# Patient Record
Sex: MALE | Race: WHITE | NOT HISPANIC OR LATINO
[De-identification: names, ages, dates, MRNs, and addresses within clinical notes are randomized per-mention and may not be internally consistent; named-entity substitution may affect disease eponyms.]

---

## 2017-02-19 ENCOUNTER — TRANSCRIPTION ENCOUNTER (OUTPATIENT)
Age: 72
End: 2017-02-19

## 2017-06-22 ENCOUNTER — TRANSCRIPTION ENCOUNTER (OUTPATIENT)
Age: 72
End: 2017-06-22

## 2020-12-29 ENCOUNTER — APPOINTMENT (OUTPATIENT)
Dept: HEPATOLOGY | Facility: CLINIC | Age: 75
End: 2020-12-29
Payer: MEDICARE

## 2020-12-29 ENCOUNTER — LABORATORY RESULT (OUTPATIENT)
Age: 75
End: 2020-12-29

## 2020-12-29 VITALS
HEIGHT: 68 IN | SYSTOLIC BLOOD PRESSURE: 122 MMHG | WEIGHT: 148 LBS | DIASTOLIC BLOOD PRESSURE: 81 MMHG | TEMPERATURE: 98 F | BODY MASS INDEX: 22.43 KG/M2 | HEART RATE: 72 BPM

## 2020-12-29 DIAGNOSIS — Z78.9 OTHER SPECIFIED HEALTH STATUS: ICD-10-CM

## 2020-12-29 PROCEDURE — 99203 OFFICE O/P NEW LOW 30 MIN: CPT

## 2020-12-29 RX ORDER — SIMVASTATIN 10 MG/1
10 TABLET, FILM COATED ORAL
Qty: 90 | Refills: 0 | Status: DISCONTINUED | COMMUNITY
Start: 2020-09-22

## 2020-12-29 RX ORDER — SODIUM SULFATE, POTASSIUM SULFATE, MAGNESIUM SULFATE 17.5; 3.13; 1.6 G/ML; G/ML; G/ML
17.5-3.13-1.6 SOLUTION, CONCENTRATE ORAL
Qty: 354 | Refills: 0 | Status: DISCONTINUED | COMMUNITY
Start: 2020-08-27

## 2020-12-29 NOTE — ASSESSMENT
[FreeTextEntry1] : 76 y/o M w/ prior alcohol use but stopped drinking 1/2019  here for elevated liver tests first noticed January 2019.\par \par 1. 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

## 2020-12-29 NOTE — PHYSICAL EXAM
[Scleral Icterus] : No Scleral Icterus [Spider Angioma] : No spider angioma(s) were observed [Abdominal  Ascites] : no ascites [Ascites Fluid Wave] : no ascites fluid wave [Ascites Tense] : ascites is not tense [Non-Tender] : non-tender [Asterixis] : no asterixis observed [Jaundice] : No jaundice [Depression] : no depression [General Appearance - Alert] : alert [General Appearance - In No Acute Distress] : in no acute distress [Auscultation Breath Sounds / Voice Sounds] : lungs were clear to auscultation bilaterally [Heart Rate And Rhythm] : heart rate was normal and rhythm regular [Heart Sounds] : normal S1 and S2 [Heart Sounds Gallop] : no gallops [Murmurs] : no murmurs [Heart Sounds Pericardial Friction Rub] : no pericardial rub [Bowel Sounds] : normal bowel sounds [Abdomen Soft] : soft [Abdomen Tenderness] : non-tender [Abdomen Mass (___ Cm)] : no abdominal mass palpated [Skin Color & Pigmentation] : normal skin color and pigmentation [Skin Turgor] : normal skin turgor [] : no rash [Oriented To Time, Place, And Person] : oriented to person, place, and time [Impaired Insight] : insight and judgment were intact [Affect] : the affect was normal

## 2020-12-29 NOTE — HISTORY OF PRESENT ILLNESS
[de-identified] : 76 y/o M w/ prior alchol use but stopped drinking 1/2019  here for elevated liver tests first noticed January 2019. \par Stopped drinking 1/2019 (1 liter of vodka a week), now drinks twice a month.\par Father w/ lung cancer, no FH liver dz or alcohol problems.\par No liver biopsy or MRI/CT.\par Colonoscopy 9/9/20 - normal.\par No herbal medications, no medications, no green tea. \par 1/31/19 - , AST 37, ALT 59\par 3/12/19 - ALP 93, AST 30, ALT 52 \par US abd on 6/8/20 - showed fatty liver\par 9/10/20 - , AST 33, ALT 46\par CAP 9/15/20 Fibroscan , 4.1 kPa\par 9/28/20-10/28/20 - started simvastatin 5 mg three times weekly\par 10/27/20 - , AST 47, ALT 82\par 12/3/20 - , AST 41, ALT 77

## 2020-12-30 LAB
ALBUMIN SERPL ELPH-MCNC: 4.5 G/DL
ALP BLD-CCNC: 133 U/L
ALT SERPL-CCNC: 68 U/L
ANA PAT FLD IF-IMP: ABNORMAL
ANA SER IF-ACNC: ABNORMAL
ANION GAP SERPL CALC-SCNC: 11 MMOL/L
AST SERPL-CCNC: 35 U/L
BASOPHILS # BLD AUTO: 0.04 K/UL
BASOPHILS NFR BLD AUTO: 0.8 %
BILIRUB SERPL-MCNC: 0.7 MG/DL
BUN SERPL-MCNC: 23 MG/DL
CALCIUM SERPL-MCNC: 9.6 MG/DL
CHLORIDE SERPL-SCNC: 104 MMOL/L
CO2 SERPL-SCNC: 25 MMOL/L
CREAT SERPL-MCNC: 1.29 MG/DL
DEPRECATED KAPPA LC FREE/LAMBDA SER: 1.26 RATIO
EOSINOPHIL # BLD AUTO: 0.1 K/UL
EOSINOPHIL NFR BLD AUTO: 2.1 %
ESTIMATED AVERAGE GLUCOSE: 94 MG/DL
FERRITIN SERPL-MCNC: 281 NG/ML
GGT SERPL-CCNC: 16 U/L
GLUCOSE SERPL-MCNC: 90 MG/DL
HBA1C MFR BLD HPLC: 4.9 %
HBV CORE IGG+IGM SER QL: NONREACTIVE
HBV SURFACE AB SER QL: NONREACTIVE
HBV SURFACE AG SER QL: NONREACTIVE
HCT VFR BLD CALC: 48.2 %
HCV AB SER QL: NONREACTIVE
HCV S/CO RATIO: 0.22 S/CO
HEPATITIS A IGG ANTIBODY: NONREACTIVE
HGB BLD-MCNC: 15.9 G/DL
HIV1+2 AB SPEC QL IA.RAPID: NONREACTIVE
IGA SER QL IEP: 201 MG/DL
IGG SER QL IEP: 1209 MG/DL
IGM SER QL IEP: 88 MG/DL
IMM GRANULOCYTES NFR BLD AUTO: 0.4 %
INR PPP: 1.05 RATIO
IRON SATN MFR SERPL: 32 %
IRON SERPL-MCNC: 85 UG/DL
KAPPA LC CSF-MCNC: 1.5 MG/DL
KAPPA LC SERPL-MCNC: 1.89 MG/DL
LYMPHOCYTES # BLD AUTO: 1.23 K/UL
LYMPHOCYTES NFR BLD AUTO: 25.8 %
MAN DIFF?: NORMAL
MCHC RBC-ENTMCNC: 30.8 PG
MCHC RBC-ENTMCNC: 33 GM/DL
MCV RBC AUTO: 93.2 FL
MITOCHONDRIA AB SER IF-ACNC: NORMAL
MONOCYTES # BLD AUTO: 0.37 K/UL
MONOCYTES NFR BLD AUTO: 7.8 %
NEUTROPHILS # BLD AUTO: 3.01 K/UL
NEUTROPHILS NFR BLD AUTO: 63.1 %
PLATELET # BLD AUTO: 192 K/UL
POTASSIUM SERPL-SCNC: 4.4 MMOL/L
PROT SERPL-MCNC: 7.2 G/DL
PT BLD: 12.4 SEC
RBC # BLD: 5.17 M/UL
RBC # FLD: 12.3 %
SARS-COV-2 IGG SERPL IA-ACNC: 0.08 INDEX
SARS-COV-2 IGG SERPL QL IA: NEGATIVE
SMOOTH MUSCLE AB SER QL IF: ABNORMAL
SODIUM SERPL-SCNC: 141 MMOL/L
T PALLIDUM AB SER QL IA: NEGATIVE
TIBC SERPL-MCNC: 263 UG/DL
TSH SERPL-ACNC: 2.64 UIU/ML
TTG IGA SER IA-ACNC: 1.3 U/ML
TTG IGA SER-ACNC: NEGATIVE
UIBC SERPL-MCNC: 178 UG/DL
WBC # FLD AUTO: 4.77 K/UL

## 2020-12-31 LAB — MPO AB + PR3 PNL SER: NORMAL

## 2021-01-04 LAB
ACE BLD-CCNC: 49 U/L
IGG4 SER-MCNC: 43 MG/DL
LKM AB SER QL IF: <20.1 UNITS

## 2021-01-06 DIAGNOSIS — Z00.00 ENCOUNTER FOR GENERAL ADULT MEDICAL EXAMINATION W/OUT ABNORMAL FINDINGS: ICD-10-CM

## 2021-01-23 ENCOUNTER — TRANSCRIPTION ENCOUNTER (OUTPATIENT)
Age: 76
End: 2021-01-23

## 2021-01-29 ENCOUNTER — NON-APPOINTMENT (OUTPATIENT)
Age: 76
End: 2021-01-29

## 2021-02-09 ENCOUNTER — APPOINTMENT (OUTPATIENT)
Dept: HEPATOLOGY | Facility: CLINIC | Age: 76
End: 2021-02-09
Payer: MEDICARE

## 2021-02-09 ENCOUNTER — TRANSCRIPTION ENCOUNTER (OUTPATIENT)
Age: 76
End: 2021-02-09

## 2021-02-09 VITALS
RESPIRATION RATE: 15 BRPM | HEIGHT: 68 IN | BODY MASS INDEX: 22.28 KG/M2 | WEIGHT: 147 LBS | HEART RATE: 70 BPM | DIASTOLIC BLOOD PRESSURE: 83 MMHG | SYSTOLIC BLOOD PRESSURE: 135 MMHG | TEMPERATURE: 97.1 F

## 2021-02-09 PROCEDURE — 99213 OFFICE O/P EST LOW 20 MIN: CPT

## 2021-02-09 NOTE — HISTORY OF PRESENT ILLNESS
[de-identified] : 74 y/o M w/ prior alcohol use but stopped drinking 1/2019  here for elevated liver tests first noticed January 2019. \par Stopped drinking 1/2019 (1 liter of vodka a week), now drinks twice a month.\par Father w/ lung cancer, no FH liver dz or alcohol problems.\par No liver biopsy or MRI/CT.\par Colonoscopy 9/9/20 - normal.\par No herbal medications, no medications, no green tea. \par 1/31/19 - , AST 37, ALT 59\par 3/12/19 - ALP 93, AST 30, ALT 52 \par US abd on 6/8/20 - showed fatty liver\par 9/10/20 - , AST 33, ALT 46\par CAP 9/15/20 Fibroscan , 4.1 kPa\par 9/28/20-10/28/20 - started simvastatin 5 mg three times weekly\par 10/27/20 - , AST 47, ALT 82\par 12/3/20 - , AST 41, ALT 77\par 2/9/21 - doing well. no complaints. off simvtastatin. got covid vaccine recently.

## 2021-02-09 NOTE — PHYSICAL EXAM
[Non-Tender] : non-tender [General Appearance - In No Acute Distress] : in no acute distress [General Appearance - Alert] : alert [Auscultation Breath Sounds / Voice Sounds] : lungs were clear to auscultation bilaterally [Heart Rate And Rhythm] : heart rate was normal and rhythm regular [Heart Sounds] : normal S1 and S2 [Heart Sounds Gallop] : no gallops [Murmurs] : no murmurs [Heart Sounds Pericardial Friction Rub] : no pericardial rub [Bowel Sounds] : normal bowel sounds [Abdomen Soft] : soft [Abdomen Tenderness] : non-tender [Abdomen Mass (___ Cm)] : no abdominal mass palpated [Skin Color & Pigmentation] : normal skin color and pigmentation [Skin Turgor] : normal skin turgor [] : no rash [Oriented To Time, Place, And Person] : oriented to person, place, and time [Impaired Insight] : insight and judgment were intact [Affect] : the affect was normal [Scleral Icterus] : No Scleral Icterus [Spider Angioma] : No spider angioma(s) were observed [Abdominal  Ascites] : no ascites [Ascites Fluid Wave] : no ascites fluid wave [Ascites Tense] : ascites is not tense [Asterixis] : no asterixis observed [Jaundice] : No jaundice [Depression] : no depression

## 2021-02-09 NOTE — ASSESSMENT
[FreeTextEntry1] : 74 y/o M w/ prior alcohol use but stopped drinking 1/2019  here for elevated liver tests first noticed January 2019.\par \par 1. 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

## 2021-02-10 LAB
ALBUMIN SERPL ELPH-MCNC: 4.5 G/DL
ALP BLD-CCNC: 126 U/L
ALT SERPL-CCNC: 64 U/L
ANION GAP SERPL CALC-SCNC: 14 MMOL/L
AST SERPL-CCNC: 34 U/L
BASOPHILS # BLD AUTO: 0.04 K/UL
BASOPHILS NFR BLD AUTO: 0.7 %
BILIRUB SERPL-MCNC: 0.5 MG/DL
BUN SERPL-MCNC: 25 MG/DL
CALCIUM SERPL-MCNC: 9.5 MG/DL
CHLORIDE SERPL-SCNC: 103 MMOL/L
CO2 SERPL-SCNC: 23 MMOL/L
CREAT SERPL-MCNC: 1.35 MG/DL
EOSINOPHIL # BLD AUTO: 0.15 K/UL
EOSINOPHIL NFR BLD AUTO: 2.7 %
GLUCOSE SERPL-MCNC: 79 MG/DL
HCT VFR BLD CALC: 49.1 %
HGB BLD-MCNC: 16.1 G/DL
IMM GRANULOCYTES NFR BLD AUTO: 0.4 %
INR PPP: 1.07 RATIO
LYMPHOCYTES # BLD AUTO: 1.59 K/UL
LYMPHOCYTES NFR BLD AUTO: 28.6 %
MAN DIFF?: NORMAL
MCHC RBC-ENTMCNC: 31 PG
MCHC RBC-ENTMCNC: 32.8 GM/DL
MCV RBC AUTO: 94.4 FL
MONOCYTES # BLD AUTO: 0.41 K/UL
MONOCYTES NFR BLD AUTO: 7.4 %
NEUTROPHILS # BLD AUTO: 3.34 K/UL
NEUTROPHILS NFR BLD AUTO: 60.2 %
PLATELET # BLD AUTO: 192 K/UL
POTASSIUM SERPL-SCNC: 4.4 MMOL/L
PROT SERPL-MCNC: 6.9 G/DL
PT BLD: 12.6 SEC
RBC # BLD: 5.2 M/UL
RBC # FLD: 12.7 %
SODIUM SERPL-SCNC: 140 MMOL/L
WBC # FLD AUTO: 5.55 K/UL

## 2021-05-10 ENCOUNTER — APPOINTMENT (OUTPATIENT)
Dept: HEPATOLOGY | Facility: CLINIC | Age: 76
End: 2021-05-10
Payer: MEDICARE

## 2021-05-10 VITALS
SYSTOLIC BLOOD PRESSURE: 144 MMHG | HEART RATE: 81 BPM | TEMPERATURE: 97.2 F | RESPIRATION RATE: 15 BRPM | DIASTOLIC BLOOD PRESSURE: 91 MMHG | HEIGHT: 68 IN

## 2021-05-10 PROCEDURE — 90636 HEP A/HEP B VACC ADULT IM: CPT | Mod: GY

## 2021-05-10 PROCEDURE — 90471 IMMUNIZATION ADMIN: CPT

## 2021-05-10 PROCEDURE — 99213 OFFICE O/P EST LOW 20 MIN: CPT | Mod: 25

## 2021-05-10 PROCEDURE — 99213 OFFICE O/P EST LOW 20 MIN: CPT

## 2021-05-10 NOTE — HISTORY OF PRESENT ILLNESS
[de-identified] : 77 y/o M w/ prior alcohol use but stopped drinking 1/2019  here for elevated liver tests first noticed January 2019. \par \par Stopped drinking 1/2019 (1 liter of vodka a week), now drinks twice a month.\par Father w/ lung cancer, no FH liver dz or alcohol problems.\par No liver biopsy or MRI/CT.\par Colonoscopy 9/9/20 - normal.\par No herbal medications, no medications, no green tea. \par 1/31/19 - , AST 37, ALT 59\par 3/12/19 - ALP 93, AST 30, ALT 52 \par US abd on 6/8/20 - showed fatty liver\par 9/10/20 - , AST 33, ALT 46\par CAP 9/15/20 Fibroscan , 4.1 kPa\par 9/28/20-10/28/20 - started simvastatin 5 mg three times weekly\par 10/27/20 - , AST 47, ALT 82\par 12/3/20 - , AST 41, ALT 77\par 2/9/21 - doing well. no complaints. off simvastatin. got covid vaccine recently.\par 4/27/21 BW - , AST 38, ALT 64. \par 5/10/21 visit - no issues. got first HAV/HBV vaccine

## 2021-05-10 NOTE — ASSESSMENT
[FreeTextEntry1] : 77 y/o M w/ prior alcohol use but stopped drinking 1/2019  here for elevated liver tests first noticed January 2019.\par \par 1. mixed hepatocellular/cholestatic liver injury since 1/2019 without evidence of steatosis or scarring on Fibroscan as of 9/2020 \par - recheck labs today\par - suspect that he will need MRI or liver biopsy in the future if liver enzymes increases\par - due to high LDL, will restart simvastatin 5 mg weekly.\par \par 2. CKD\par - referred to nephrology, question - anything he can be on to prevent progression of kidney disease\par \par 3. RHM\par - HAV and HBV vaccination s/p first vaccination\par \par recheck labs in 1 month\par \par RTC 3 months

## 2021-05-10 NOTE — PHYSICAL EXAM
[Non-Tender] : non-tender [General Appearance - Alert] : alert [General Appearance - In No Acute Distress] : in no acute distress [Auscultation Breath Sounds / Voice Sounds] : lungs were clear to auscultation bilaterally [Heart Rate And Rhythm] : heart rate was normal and rhythm regular [Heart Sounds] : normal S1 and S2 [Heart Sounds Gallop] : no gallops [Murmurs] : no murmurs [Heart Sounds Pericardial Friction Rub] : no pericardial rub [Bowel Sounds] : normal bowel sounds [Abdomen Soft] : soft [Abdomen Tenderness] : non-tender [Abdomen Mass (___ Cm)] : no abdominal mass palpated [Skin Color & Pigmentation] : normal skin color and pigmentation [Skin Turgor] : normal skin turgor [] : no rash [Oriented To Time, Place, And Person] : oriented to person, place, and time [Impaired Insight] : insight and judgment were intact [Affect] : the affect was normal [Scleral Icterus] : No Scleral Icterus [Spider Angioma] : No spider angioma(s) were observed [Abdominal  Ascites] : no ascites [Ascites Fluid Wave] : no ascites fluid wave [Ascites Tense] : ascites is not tense [Asterixis] : no asterixis observed [Jaundice] : No jaundice [Depression] : no depression

## 2021-05-11 ENCOUNTER — NON-APPOINTMENT (OUTPATIENT)
Age: 76
End: 2021-05-11

## 2021-06-11 ENCOUNTER — APPOINTMENT (OUTPATIENT)
Dept: NEPHROLOGY | Facility: CLINIC | Age: 76
End: 2021-06-11
Payer: MEDICARE

## 2021-06-11 VITALS
SYSTOLIC BLOOD PRESSURE: 155 MMHG | DIASTOLIC BLOOD PRESSURE: 84 MMHG | WEIGHT: 147 LBS | BODY MASS INDEX: 22.35 KG/M2 | TEMPERATURE: 98 F | OXYGEN SATURATION: 98 % | HEART RATE: 84 BPM

## 2021-06-11 PROCEDURE — 99205 OFFICE O/P NEW HI 60 MIN: CPT

## 2021-06-11 NOTE — PHYSICAL EXAM
[General Appearance - Alert] : alert [General Appearance - In No Acute Distress] : in no acute distress [General Appearance - Well Nourished] : well nourished [General Appearance - Well-Appearing] : healthy appearing [Sclera] : the sclera and conjunctiva were normal [PERRL With Normal Accommodation] : pupils were equal in size, round, and reactive to light [Extraocular Movements] : extraocular movements were intact [Outer Ear] : the ears and nose were normal in appearance [Hearing Threshold Finger Rub Not [place]] : hearing was normal [Neck Appearance] : the appearance of the neck was normal [Neck Cervical Mass (___cm)] : no neck mass was observed [Jugular Venous Distention Increased] : there was no jugular-venous distention [Respiration, Rhythm And Depth] : normal respiratory rhythm and effort [Exaggerated Use Of Accessory Muscles For Inspiration] : no accessory muscle use [Auscultation Breath Sounds / Voice Sounds] : lungs were clear to auscultation bilaterally [Apical Impulse] : the apical impulse was normal [Heart Rate And Rhythm] : heart rate was normal and rhythm regular [Edema] : there was no peripheral edema [Bowel Sounds] : normal bowel sounds [Abdomen Soft] : soft [Abdomen Tenderness] : non-tender [Abdomen Mass (___ Cm)] : no abdominal mass palpated [No CVA Tenderness] : no ~M costovertebral angle tenderness [No Spinal Tenderness] : no spinal tenderness [Abnormal Walk] : normal gait [Skin Color & Pigmentation] : normal skin color and pigmentation [Nail Clubbing] : no clubbing  or cyanosis of the fingernails [Skin Turgor] : normal skin turgor [] : no rash [Skin Lesions] : no skin lesions [Cranial Nerves] : cranial nerves 2-12 were intact [No Focal Deficits] : no focal deficits [Oriented To Time, Place, And Person] : oriented to person, place, and time [Impaired Insight] : insight and judgment were intact [Affect] : the affect was normal [Mood] : the mood was normal [Memory Recent] : recent memory was not impaired

## 2021-06-14 LAB
APPEARANCE: CLEAR
BACTERIA: NEGATIVE
BILIRUBIN URINE: NEGATIVE
BLOOD URINE: NEGATIVE
COLOR: NORMAL
CREAT SPEC-SCNC: 128 MG/DL
CREAT SPEC-SCNC: 128 MG/DL
CREAT/PROT UR: 0 RATIO
GLUCOSE QUALITATIVE U: NEGATIVE
HYALINE CASTS: 0 /LPF
KETONES URINE: NEGATIVE
LEUKOCYTE ESTERASE URINE: NEGATIVE
MICROALBUMIN 24H UR DL<=1MG/L-MCNC: <1.2 MG/DL
MICROALBUMIN/CREAT 24H UR-RTO: NORMAL MG/G
MICROSCOPIC-UA: NORMAL
NITRITE URINE: NEGATIVE
PH URINE: 6
PROT UR-MCNC: 6 MG/DL
PROTEIN URINE: NEGATIVE
RED BLOOD CELLS URINE: 2 /HPF
SPECIFIC GRAVITY URINE: 1.02
SQUAMOUS EPITHELIAL CELLS: 0 /HPF
UROBILINOGEN URINE: NORMAL
WHITE BLOOD CELLS URINE: 0 /HPF

## 2021-06-14 NOTE — CONSULT LETTER
[Dear  ___] : Dear  [unfilled], [Consult Letter:] : I had the pleasure of evaluating your patient, [unfilled]. [Please see my note below.] : Please see my note below. [Consult Closing:] : Thank you very much for allowing me to participate in the care of this patient.  If you have any questions, please do not hesitate to contact me. [Sincerely,] : Sincerely, [FreeTextEntry3] : Gurvinder Greene\par email: [email or web address]

## 2021-06-14 NOTE — ASSESSMENT
[FreeTextEntry1] : Mr. KYLIE STEVE is a 77yo M being evaluated for CKD\par \par -----------------------------------------\par # CKD STAGE 2-3\par - Baseline Cr. 1.2 to 1.35 (2020 to 2021)\par - SCr trend: stable at 1.35 past few tests\par - U/a: PENDING\par - UPC: PENDING\par - Sono: PENDING\par --- RISK FACTORS:\par 1) Age\par 2) Vol depletion due to poor intake + regular exercise\par 3) Obstructive uropathy- pt w/ mild prostate enlargement\par --- PLAN:\par The patient has borderline stage 3 CKD (eGFR 58 via MDRD) with stable SCr this year during repeated tests. No other metabolic abnormalities\par 1) No need for repeat renal panel. Last labs 6/1\par 2) U/A, UPC\par 3) Sono to r/o obstruction\par 4) Urology follow-up\par \par I will call the patient with lab results early next week. Based on stable renal function, can see me yearly or for any changes

## 2021-06-14 NOTE — ADDENDUM
[FreeTextEntry1] : - Spoke to Sandro about u/a, UPC. Sono still PENDING.\par - No further workup or intervention from my standpoint\par - I explaiend that techinically he has "CKD, stage 2 to early stage 3 which is likely age-related. It is techinically an abnormal lab result but for your age, I find it completely within an acceptable range"\par - Follow-up 1 year or PRN\par - Will await sono findings

## 2021-06-14 NOTE — HISTORY OF PRESENT ILLNESS
[FreeTextEntry1] : Mr. KYLIE STEVE is a 77yo M with mixed hepatocellular. cholestatic liver injury who came in for an initial visit on 6/11/2021 for CKD\par \par ---------------------------------\par 6/11/2021\par - Patient admittedly anxious as he read his labs. He looked up "GFR" and "CKD" and found that he has "advanced CKD"\par - Denied NSAID use\par - Denied dysuria, hematuria, flank pain, frothy urine, flank pain, obstructive symptoms

## 2021-06-15 ENCOUNTER — APPOINTMENT (OUTPATIENT)
Dept: HEPATOLOGY | Facility: CLINIC | Age: 76
End: 2021-06-15
Payer: MEDICARE

## 2021-06-15 DIAGNOSIS — Z23 ENCOUNTER FOR IMMUNIZATION: ICD-10-CM

## 2021-06-15 PROCEDURE — 90636 HEP A/HEP B VACC ADULT IM: CPT | Mod: GY

## 2021-06-15 PROCEDURE — G0010: CPT

## 2021-08-17 ENCOUNTER — APPOINTMENT (OUTPATIENT)
Dept: HEPATOLOGY | Facility: CLINIC | Age: 76
End: 2021-08-17
Payer: MEDICARE

## 2021-08-17 ENCOUNTER — NON-APPOINTMENT (OUTPATIENT)
Age: 76
End: 2021-08-17

## 2021-08-17 VITALS
BODY MASS INDEX: 22.58 KG/M2 | HEART RATE: 73 BPM | TEMPERATURE: 98 F | RESPIRATION RATE: 15 BRPM | DIASTOLIC BLOOD PRESSURE: 81 MMHG | OXYGEN SATURATION: 98 % | WEIGHT: 149 LBS | HEIGHT: 68 IN | SYSTOLIC BLOOD PRESSURE: 133 MMHG

## 2021-08-17 LAB
ALBUMIN SERPL ELPH-MCNC: 4.2 G/DL
ALP BLD-CCNC: 121 U/L
ALT SERPL-CCNC: 48 U/L
ANION GAP SERPL CALC-SCNC: 10 MMOL/L
AST SERPL-CCNC: 30 U/L
BASOPHILS # BLD AUTO: 0.03 K/UL
BASOPHILS NFR BLD AUTO: 0.6 %
BILIRUB SERPL-MCNC: 0.8 MG/DL
BUN SERPL-MCNC: 17 MG/DL
CALCIUM SERPL-MCNC: 9.4 MG/DL
CHLORIDE SERPL-SCNC: 107 MMOL/L
CK SERPL-CCNC: 139 U/L
CO2 SERPL-SCNC: 25 MMOL/L
CREAT SERPL-MCNC: 1.3 MG/DL
EOSINOPHIL # BLD AUTO: 0.15 K/UL
EOSINOPHIL NFR BLD AUTO: 2.8 %
GGT SERPL-CCNC: 14 U/L
GLUCOSE SERPL-MCNC: 72 MG/DL
HCT VFR BLD CALC: 46.1 %
HGB BLD-MCNC: 16 G/DL
IMM GRANULOCYTES NFR BLD AUTO: 0.7 %
INR PPP: 1.03 RATIO
LYMPHOCYTES # BLD AUTO: 1.21 K/UL
LYMPHOCYTES NFR BLD AUTO: 22.2 %
MAN DIFF?: NORMAL
MCHC RBC-ENTMCNC: 31.7 PG
MCHC RBC-ENTMCNC: 34.7 GM/DL
MCV RBC AUTO: 91.3 FL
MONOCYTES # BLD AUTO: 0.48 K/UL
MONOCYTES NFR BLD AUTO: 8.8 %
NEUTROPHILS # BLD AUTO: 3.53 K/UL
NEUTROPHILS NFR BLD AUTO: 64.9 %
PLATELET # BLD AUTO: 183 K/UL
POTASSIUM SERPL-SCNC: 4.4 MMOL/L
PROT SERPL-MCNC: 6.6 G/DL
PT BLD: 12.1 SEC
RBC # BLD: 5.05 M/UL
RBC # FLD: 12.8 %
SODIUM SERPL-SCNC: 142 MMOL/L
WBC # FLD AUTO: 5.44 K/UL

## 2021-08-17 PROCEDURE — 99214 OFFICE O/P EST MOD 30 MIN: CPT

## 2021-08-17 NOTE — PHYSICAL EXAM
[Non-Tender] : non-tender [General Appearance - In No Acute Distress] : in no acute distress [General Appearance - Alert] : alert [Auscultation Breath Sounds / Voice Sounds] : lungs were clear to auscultation bilaterally [Heart Rate And Rhythm] : heart rate was normal and rhythm regular [Heart Sounds] : normal S1 and S2 [Heart Sounds Gallop] : no gallops [Murmurs] : no murmurs [Heart Sounds Pericardial Friction Rub] : no pericardial rub [Abdomen Soft] : soft [Bowel Sounds] : normal bowel sounds [Abdomen Tenderness] : non-tender [Abdomen Mass (___ Cm)] : no abdominal mass palpated [Skin Color & Pigmentation] : normal skin color and pigmentation [Skin Turgor] : normal skin turgor [] : no rash [Impaired Insight] : insight and judgment were intact [Oriented To Time, Place, And Person] : oriented to person, place, and time [Affect] : the affect was normal [Scleral Icterus] : No Scleral Icterus [Spider Angioma] : No spider angioma(s) were observed [Abdominal  Ascites] : no ascites [Ascites Fluid Wave] : no ascites fluid wave [Ascites Tense] : ascites is not tense [Asterixis] : no asterixis observed [Jaundice] : No jaundice [Depression] : no depression

## 2021-08-17 NOTE — ASSESSMENT
[FreeTextEntry1] : 77 y/o M w/ prior alcohol use but stopped drinking 1/2019  here for elevated liver tests first noticed January 2019.\par \par 1. Mixed hepatocellular/cholestatic liver injury since 1/2019 without evidence of steatosis or scarring on Fibroscan as of 9/2020 \par - recheck labs today\par - suspect that he will need MRI or liver biopsy in the future if liver enzymes increases however if it remains stable and Fibroscan does not show evidence of scarring then will hold off\par - due to high LDL, currently on simvastatin 5 mg weekly (restarted on 5/10-)\par \par 2. CKD\par - following nephrology, next appt in a year. \par \par 3. RHM\par - HAV and HBV vaccination s/p first vaccination\par \par \par \par Recheck labs now and in 3 months w/ lipid panel\par \par RTC 3 months

## 2021-08-17 NOTE — HISTORY OF PRESENT ILLNESS
[de-identified] : 75 y/o M w/ prior alcohol use but stopped drinking 1/2019  here for elevated liver tests first noticed January 2019. \par \par Stopped drinking 1/2019 (1 liter of vodka a week), now drinks twice a month.\par Father w/ lung cancer, no FH liver dz or alcohol problems.\par No liver biopsy or MRI/CT.\par Colonoscopy 9/9/20 - normal.\par No herbal medications, no medications, no green tea. \par 1/31/19 - , AST 37, ALT 59\par 3/12/19 - ALP 93, AST 30, ALT 52 \par US abd on 6/8/20 - showed fatty liver\par 9/10/20 - , AST 33, ALT 46\par CAP 9/15/20 Fibroscan , 4.1 kPa\par 9/28/20-10/28/20 - started simvastatin 5 mg three times weekly\par 10/27/20 - , AST 47, ALT 82\par 12/3/20 - , AST 41, ALT 77\par 2/9/21 - doing well. no complaints. off simvastatin. got covid vaccine recently.\par 4/27/21 BW - , AST 38, ALT 64. \par 5/10/21 visit - no issues. got first HAV/HBV vaccine\par 6/1/21 BW - , AST 40, ALT 66\par 8/17/21 visit - recently was in the ED at the end of June 2021 for nausea related to tooth extraction. He also saw a nephrologist Dr. Greene. He is currently taking simvastatin 5 mg weekly. weight stable.

## 2021-09-28 ENCOUNTER — NON-APPOINTMENT (OUTPATIENT)
Age: 76
End: 2021-09-28

## 2021-10-18 ENCOUNTER — TRANSCRIPTION ENCOUNTER (OUTPATIENT)
Age: 76
End: 2021-10-18

## 2021-11-16 ENCOUNTER — APPOINTMENT (OUTPATIENT)
Dept: HEPATOLOGY | Facility: CLINIC | Age: 76
End: 2021-11-16
Payer: MEDICARE

## 2021-11-16 VITALS
RESPIRATION RATE: 16 BRPM | HEIGHT: 68 IN | BODY MASS INDEX: 22.58 KG/M2 | HEART RATE: 85 BPM | DIASTOLIC BLOOD PRESSURE: 88 MMHG | WEIGHT: 149 LBS | OXYGEN SATURATION: 100 % | SYSTOLIC BLOOD PRESSURE: 137 MMHG | TEMPERATURE: 97.3 F

## 2021-11-16 VITALS
TEMPERATURE: 97.3 F | SYSTOLIC BLOOD PRESSURE: 122 MMHG | OXYGEN SATURATION: 76 % | DIASTOLIC BLOOD PRESSURE: 79 MMHG | HEIGHT: 60 IN | RESPIRATION RATE: 16 BRPM | BODY MASS INDEX: 29.1 KG/M2

## 2021-11-16 PROCEDURE — G0010: CPT

## 2021-11-16 PROCEDURE — 99214 OFFICE O/P EST MOD 30 MIN: CPT | Mod: 25

## 2021-11-16 PROCEDURE — 90636 HEP A/HEP B VACC ADULT IM: CPT | Mod: GY

## 2022-03-01 ENCOUNTER — APPOINTMENT (OUTPATIENT)
Dept: HEPATOLOGY | Facility: CLINIC | Age: 77
End: 2022-03-01
Payer: MEDICARE

## 2022-03-01 VITALS
TEMPERATURE: 98 F | RESPIRATION RATE: 16 BRPM | DIASTOLIC BLOOD PRESSURE: 82 MMHG | BODY MASS INDEX: 29.06 KG/M2 | HEART RATE: 78 BPM | WEIGHT: 148 LBS | HEIGHT: 60 IN | SYSTOLIC BLOOD PRESSURE: 125 MMHG | OXYGEN SATURATION: 99 %

## 2022-03-01 PROCEDURE — 99214 OFFICE O/P EST MOD 30 MIN: CPT

## 2022-03-01 NOTE — HISTORY OF PRESENT ILLNESS
[de-identified] : 75 y/o M w/ prior alcohol use but stopped drinking 1/2019  here for elevated liver tests first noticed January 2019. \par \par Stopped drinking 1/2019 (1 liter of vodka a week), now drinks twice a month.\par Father w/ lung cancer, no FH liver dz or alcohol problems.\par No liver biopsy or MRI/CT.\par Colonoscopy 9/9/20 - normal.\par No herbal medications, no medications, no green tea. \par 1/31/19 - , AST 37, ALT 59\par 3/12/19 - ALP 93, AST 30, ALT 52 \par US abd on 6/8/20 - showed fatty liver\par 9/10/20 - , AST 33, ALT 46\par CAP 9/15/20 Fibroscan , 4.1 kPa\par 9/28/20-10/28/20 - started simvastatin 5 mg three times weekly\par 10/27/20 - , AST 47, ALT 82\par 12/3/20 - , AST 41, ALT 77\par 2/9/21 - doing well. no complaints. off simvastatin. got covid vaccine recently.\par 4/27/21 BW - , AST 38, ALT 64. \par 5/10/21 visit - no issues. got first HAV/HBV vaccine\par 6/1/21 BW - , AST 40, ALT 66\par 8/17/21 visit - recently was in the ED at the end of June 2021 for nausea related to tooth extraction. He also saw a nephrologist Dr. Greene. He is currently taking simvastatin 5 mg weekly. weight stable.\par 9/28/21 BW - AST 36, ALT 37\par 11/5/21 BW - AST 42, ALT 72, , . he is on simvastatin 5 mg weekly. \par 3/1/22 visit - on pravastatin 10 mg weekly. 2/18/22 BW - AST 33 ALT 54\par

## 2022-03-01 NOTE — ASSESSMENT
[FreeTextEntry1] : 75 y/o M w/ prior alcohol use but stopped drinking 1/2019  here for elevated liver tests first noticed January 2019.\par \par # Mixed hepatocellular/cholestatic liver injury since 1/2019 without evidence of steatosis or scarring on Fibroscan as of 9/2020 \par - suspect that he will need MRI or liver biopsy in the future if liver enzymes increases however if it remains stable and Fibroscan does not show evidence of scarring then will hold off\par - due to high LDL, currently on simvastatin 5 mg weekly (restarted on 5/10-)\par Labs now show mildy increasing liver enzymes compared to 9/2021 when it was relatively normal.\par Will switch him from simvastatin to pravastatin 10 mg weekly (take prior to bedtime) as pravastatin is less liver toxic. Unusually we can keep statins on unless lab tests are >3x ULN (120 U/L). \par \par # CKD\par - following nephrology, next appt in a year. \par \par # RHM\par - HAV and HBV vaccination s/p heplisav\par \par \par \par Recheck labs in 6 months w/ lipid panel\par

## 2022-03-01 NOTE — ASSESSMENT
[FreeTextEntry1] : 77 y/o M w/ prior alcohol use but stopped drinking 1/2019  here for elevated liver tests first noticed January 2019.\par \par # Mixed hepatocellular/cholestatic liver injury since 1/2019 without evidence of steatosis or scarring on Fibroscan as of 9/2020 \par - suspect that he will need MRI or liver biopsy in the future if liver enzymes increases however if it remains stable and Fibroscan does not show evidence of scarring then will hold off\par - due to high LDL, he was put on simvastatin 5 mg weekly (restarted on 5/10-)\par Labs then showed mildly increasing liver enzymes compared to 9/2021 when it was relatively normal.\par He was then switched from simvastatin to pravastatin 10 mg weekly (take prior to bedtime) as pravastatin is less liver toxic. Unusually we can keep statins on unless lab tests are >3x ULN (120 U/L). \par his liver enzymes have now improved bu we will keep following.\par \par # CKD\par - following nephrology, next appt in a year. \par \par # RHM\par - HAV and HBV vaccination s/p heplisav\par \par \par \par Recheck labs in 6 months w/ lipid panel\par

## 2022-03-01 NOTE — HISTORY OF PRESENT ILLNESS
[de-identified] : 75 y/o M w/ prior alcohol use but stopped drinking 1/2019  here for elevated liver tests first noticed January 2019. \par \par Stopped drinking 1/2019 (1 liter of vodka a week), now drinks twice a month.\par Father w/ lung cancer, no FH liver dz or alcohol problems.\par No liver biopsy or MRI/CT.\par Colonoscopy 9/9/20 - normal.\par No herbal medications, no medications, no green tea. \par 1/31/19 - , AST 37, ALT 59\par 3/12/19 - ALP 93, AST 30, ALT 52 \par US abd on 6/8/20 - showed fatty liver\par 9/10/20 - , AST 33, ALT 46\par CAP 9/15/20 Fibroscan , 4.1 kPa\par 9/28/20-10/28/20 - started simvastatin 5 mg three times weekly\par 10/27/20 - , AST 47, ALT 82\par 12/3/20 - , AST 41, ALT 77\par 2/9/21 - doing well. no complaints. off simvastatin. got covid vaccine recently.\par 4/27/21 BW - , AST 38, ALT 64. \par 5/10/21 visit - no issues. got first HAV/HBV vaccine\par 6/1/21 BW - , AST 40, ALT 66\par 8/17/21 visit - recently was in the ED at the end of June 2021 for nausea related to tooth extraction. He also saw a nephrologist Dr. Greene. He is currently taking simvastatin 5 mg weekly. weight stable.\par 9/28/21 BW - AST 36, ALT 37\par 11/5/21 BW - AST 42, ALT 72, , . he is on simvastatin 5 mg weekly. \par 3/1/22 visit - on pravastatin 10 mg weekly. 2/18/22 BW - AST 33 ALT 54\par

## 2022-08-17 ENCOUNTER — APPOINTMENT (OUTPATIENT)
Dept: HEPATOLOGY | Facility: CLINIC | Age: 77
End: 2022-08-17

## 2022-09-09 ENCOUNTER — APPOINTMENT (OUTPATIENT)
Dept: HEPATOLOGY | Facility: CLINIC | Age: 77
End: 2022-09-09

## 2022-09-23 ENCOUNTER — APPOINTMENT (OUTPATIENT)
Dept: HEPATOLOGY | Facility: CLINIC | Age: 77
End: 2022-09-23

## 2022-09-23 VITALS
WEIGHT: 145 LBS | HEIGHT: 68 IN | BODY MASS INDEX: 21.98 KG/M2 | DIASTOLIC BLOOD PRESSURE: 76 MMHG | SYSTOLIC BLOOD PRESSURE: 127 MMHG | TEMPERATURE: 97.6 F | HEART RATE: 79 BPM | RESPIRATION RATE: 16 BRPM | OXYGEN SATURATION: 99 %

## 2022-09-23 PROCEDURE — 99214 OFFICE O/P EST MOD 30 MIN: CPT

## 2022-09-23 NOTE — HISTORY OF PRESENT ILLNESS
[de-identified] : 78 y/o M w/ prior alcohol use but stopped drinking 1/2019  here for elevated liver tests first noticed January 2019. \par \par Stopped drinking 1/2019 (1 liter of vodka a week), now drinks twice a month.\par Father w/ lung cancer, no FH liver dz or alcohol problems.\par No liver biopsy or MRI/CT.\par Colonoscopy 9/9/20 - normal.\par No herbal medications, no medications, no green tea. \par 1/31/19 - , AST 37, ALT 59\par 3/12/19 - ALP 93, AST 30, ALT 52 \par US abd on 6/8/20 - showed fatty liver\par 9/10/20 - , AST 33, ALT 46\par CAP 9/15/20 Fibroscan , 4.1 kPa\par 9/28/20-10/28/20 - started simvastatin 5 mg three times weekly\par 10/27/20 - , AST 47, ALT 82\par 12/3/20 - , AST 41, ALT 77\par 2/9/21 - doing well. no complaints. off simvastatin. got covid vaccine recently.\par 4/27/21 BW - , AST 38, ALT 64. \par 5/10/21 visit - no issues. got first HAV/HBV vaccine\par 6/1/21 BW - , AST 40, ALT 66\par 8/17/21 visit - recently was in the ED at the end of June 2021 for nausea related to tooth extraction. He also saw a nephrologist Dr. Greene. He is currently taking simvastatin 5 mg weekly. weight stable.\par 9/28/21 BW - AST 36, ALT 37\par 11/5/21 BW - AST 42, ALT 72, , . he is on simvastatin 5 mg weekly. \par 3/1/22 visit - on pravastatin 10 mg weekly. 2/18/22 BW - AST 33 ALT 54\par 9/23/22 visit - 9/9/22 B - AST 32, ALT 47. on pravastatin 10 mg once weekly.

## 2022-09-23 NOTE — ASSESSMENT
[FreeTextEntry1] : 78 y/o M w/ prior alcohol use but stopped drinking 1/2019  here for elevated liver tests first noticed January 2019.\par \par # Mixed hepatocellular/cholestatic liver injury since 1/2019 without evidence of steatosis or scarring on Fibroscan as of 9/2020. Suspect due to DILI from statin, mild, developing tolerance which is a known phenomenon with statins\par Due to high LDL, he was put on simvastatin 5 mg weekly (restarted on 5/10-)\par Labs then showed mildly increasing liver enzymes compared to 9/2021 when it was relatively normal.\par He was then switched from simvastatin to pravastatin 10 mg weekly (take prior to bedtime) as pravastatin is less liver toxic. Unusually we can keep statins on unless lab tests are >3x ULN (120 U/L). \par his liver enzymes have now improved and stable.\par recommend lab checks every 6-12 months by his PCP\par refer back if ALT >2x ULN (80 IU/mL)\par \par Return to clinic as needed

## 2022-10-26 ENCOUNTER — NON-APPOINTMENT (OUTPATIENT)
Age: 77
End: 2022-10-26

## 2022-12-27 ENCOUNTER — RX RENEWAL (OUTPATIENT)
Age: 77
End: 2022-12-27

## 2022-12-27 RX ORDER — PRAVASTATIN SODIUM 10 MG/1
10 TABLET ORAL
Qty: 30 | Refills: 11 | Status: ACTIVE | COMMUNITY
Start: 2021-11-16 | End: 1900-01-01

## 2023-01-12 ENCOUNTER — APPOINTMENT (OUTPATIENT)
Dept: HEPATOLOGY | Facility: CLINIC | Age: 78
End: 2023-01-12
Payer: MEDICARE

## 2023-01-12 VITALS
RESPIRATION RATE: 16 BRPM | HEART RATE: 71 BPM | SYSTOLIC BLOOD PRESSURE: 106 MMHG | OXYGEN SATURATION: 99 % | BODY MASS INDEX: 22.28 KG/M2 | TEMPERATURE: 98 F | DIASTOLIC BLOOD PRESSURE: 72 MMHG | WEIGHT: 147 LBS | HEIGHT: 68 IN

## 2023-01-12 PROCEDURE — 99214 OFFICE O/P EST MOD 30 MIN: CPT

## 2023-02-07 ENCOUNTER — NON-APPOINTMENT (OUTPATIENT)
Age: 78
End: 2023-02-07

## 2023-07-12 ENCOUNTER — APPOINTMENT (OUTPATIENT)
Dept: HEPATOLOGY | Facility: CLINIC | Age: 78
End: 2023-07-12
Payer: MEDICARE

## 2023-07-12 VITALS
RESPIRATION RATE: 14 BRPM | HEART RATE: 65 BPM | OXYGEN SATURATION: 99 % | DIASTOLIC BLOOD PRESSURE: 90 MMHG | BODY MASS INDEX: 21.98 KG/M2 | WEIGHT: 145 LBS | TEMPERATURE: 97.6 F | HEIGHT: 68 IN | SYSTOLIC BLOOD PRESSURE: 146 MMHG

## 2023-07-12 PROCEDURE — 99215 OFFICE O/P EST HI 40 MIN: CPT

## 2023-07-12 PROCEDURE — 76981 USE PARENCHYMA: CPT

## 2023-07-23 ENCOUNTER — TRANSCRIPTION ENCOUNTER (OUTPATIENT)
Age: 78
End: 2023-07-23

## 2023-10-31 ENCOUNTER — NON-APPOINTMENT (OUTPATIENT)
Age: 78
End: 2023-10-31

## 2024-01-17 ENCOUNTER — APPOINTMENT (OUTPATIENT)
Dept: HEPATOLOGY | Facility: CLINIC | Age: 79
End: 2024-01-17
Payer: MEDICARE

## 2024-01-17 VITALS
HEIGHT: 68 IN | WEIGHT: 144 LBS | RESPIRATION RATE: 14 BRPM | BODY MASS INDEX: 21.82 KG/M2 | DIASTOLIC BLOOD PRESSURE: 84 MMHG | HEART RATE: 73 BPM | SYSTOLIC BLOOD PRESSURE: 127 MMHG | TEMPERATURE: 97.9 F | OXYGEN SATURATION: 98 %

## 2024-01-17 DIAGNOSIS — R74.8 ABNORMAL LEVELS OF OTHER SERUM ENZYMES: ICD-10-CM

## 2024-01-17 DIAGNOSIS — K76.0 FATTY (CHANGE OF) LIVER, NOT ELSEWHERE CLASSIFIED: ICD-10-CM

## 2024-01-17 DIAGNOSIS — N18.30 CHRONIC KIDNEY DISEASE, STAGE 3 UNSPECIFIED: ICD-10-CM

## 2024-01-17 DIAGNOSIS — E78.5 HYPERLIPIDEMIA, UNSPECIFIED: ICD-10-CM

## 2024-01-17 PROCEDURE — 99214 OFFICE O/P EST MOD 30 MIN: CPT

## 2024-07-03 ENCOUNTER — APPOINTMENT (OUTPATIENT)
Dept: ULTRASOUND IMAGING | Facility: CLINIC | Age: 79
End: 2024-07-03
Payer: MEDICARE

## 2024-07-03 ENCOUNTER — OUTPATIENT (OUTPATIENT)
Dept: OUTPATIENT SERVICES | Facility: HOSPITAL | Age: 79
LOS: 1 days | End: 2024-07-03
Payer: MEDICARE

## 2024-07-03 DIAGNOSIS — Z00.8 ENCOUNTER FOR OTHER GENERAL EXAMINATION: ICD-10-CM

## 2024-07-03 PROCEDURE — 76700 US EXAM ABDOM COMPLETE: CPT

## 2024-07-03 PROCEDURE — 76700 US EXAM ABDOM COMPLETE: CPT | Mod: 26

## 2024-07-05 ENCOUNTER — NON-APPOINTMENT (OUTPATIENT)
Age: 79
End: 2024-07-05

## 2024-07-10 ENCOUNTER — APPOINTMENT (OUTPATIENT)
Dept: HEPATOLOGY | Facility: CLINIC | Age: 79
End: 2024-07-10
Payer: MEDICARE

## 2024-07-10 VITALS
SYSTOLIC BLOOD PRESSURE: 115 MMHG | HEIGHT: 68 IN | TEMPERATURE: 98.3 F | RESPIRATION RATE: 14 BRPM | HEART RATE: 72 BPM | BODY MASS INDEX: 22.43 KG/M2 | DIASTOLIC BLOOD PRESSURE: 75 MMHG | OXYGEN SATURATION: 97 % | WEIGHT: 148 LBS

## 2024-07-10 DIAGNOSIS — R74.8 ABNORMAL LEVELS OF OTHER SERUM ENZYMES: ICD-10-CM

## 2024-07-10 DIAGNOSIS — E78.5 HYPERLIPIDEMIA, UNSPECIFIED: ICD-10-CM

## 2024-07-10 DIAGNOSIS — N18.30 CHRONIC KIDNEY DISEASE, STAGE 3 UNSPECIFIED: ICD-10-CM

## 2024-07-10 DIAGNOSIS — Z87.19 PERSONAL HISTORY OF OTHER DISEASES OF THE DIGESTIVE SYSTEM: ICD-10-CM

## 2024-07-10 PROCEDURE — 99214 OFFICE O/P EST MOD 30 MIN: CPT

## 2024-07-10 PROCEDURE — 99204 OFFICE O/P NEW MOD 45 MIN: CPT

## 2024-08-09 ENCOUNTER — TRANSCRIPTION ENCOUNTER (OUTPATIENT)
Age: 79
End: 2024-08-09

## 2024-09-28 ENCOUNTER — NON-APPOINTMENT (OUTPATIENT)
Age: 79
End: 2024-09-28

## 2025-01-16 DIAGNOSIS — R74.8 ABNORMAL LEVELS OF OTHER SERUM ENZYMES: ICD-10-CM

## 2025-01-17 ENCOUNTER — NON-APPOINTMENT (OUTPATIENT)
Age: 80
End: 2025-01-17

## 2025-02-03 ENCOUNTER — TRANSCRIPTION ENCOUNTER (OUTPATIENT)
Age: 80
End: 2025-02-03

## 2025-02-05 ENCOUNTER — NON-APPOINTMENT (OUTPATIENT)
Age: 80
End: 2025-02-05

## 2025-02-05 ENCOUNTER — TRANSCRIPTION ENCOUNTER (OUTPATIENT)
Age: 80
End: 2025-02-05

## 2025-05-19 ENCOUNTER — NON-APPOINTMENT (OUTPATIENT)
Age: 80
End: 2025-05-19

## 2025-06-28 ENCOUNTER — NON-APPOINTMENT (OUTPATIENT)
Age: 80
End: 2025-06-28

## 2025-07-30 ENCOUNTER — APPOINTMENT (OUTPATIENT)
Dept: HEPATOLOGY | Facility: CLINIC | Age: 80
End: 2025-07-30
Payer: MEDICARE

## 2025-07-30 VITALS
HEIGHT: 68 IN | OXYGEN SATURATION: 100 % | WEIGHT: 145 LBS | TEMPERATURE: 98 F | SYSTOLIC BLOOD PRESSURE: 106 MMHG | HEART RATE: 80 BPM | DIASTOLIC BLOOD PRESSURE: 71 MMHG | BODY MASS INDEX: 21.98 KG/M2

## 2025-07-30 DIAGNOSIS — K76.0 FATTY (CHANGE OF) LIVER, NOT ELSEWHERE CLASSIFIED: ICD-10-CM

## 2025-07-30 DIAGNOSIS — R74.8 ABNORMAL LEVELS OF OTHER SERUM ENZYMES: ICD-10-CM

## 2025-07-30 PROCEDURE — 99214 OFFICE O/P EST MOD 30 MIN: CPT

## 2025-08-05 ENCOUNTER — OUTPATIENT (OUTPATIENT)
Dept: OUTPATIENT SERVICES | Facility: HOSPITAL | Age: 80
LOS: 1 days | End: 2025-08-05
Payer: MEDICARE

## 2025-08-05 ENCOUNTER — APPOINTMENT (OUTPATIENT)
Dept: ULTRASOUND IMAGING | Facility: CLINIC | Age: 80
End: 2025-08-05
Payer: MEDICARE

## 2025-08-05 DIAGNOSIS — Z00.00 ENCOUNTER FOR GENERAL ADULT MEDICAL EXAMINATION WITHOUT ABNORMAL FINDINGS: ICD-10-CM

## 2025-08-05 PROCEDURE — 76700 US EXAM ABDOM COMPLETE: CPT

## 2025-08-05 PROCEDURE — 76700 US EXAM ABDOM COMPLETE: CPT | Mod: 26

## 2025-08-15 ENCOUNTER — TRANSCRIPTION ENCOUNTER (OUTPATIENT)
Age: 80
End: 2025-08-15

## 2025-08-20 ENCOUNTER — TRANSCRIPTION ENCOUNTER (OUTPATIENT)
Age: 80
End: 2025-08-20